# Patient Record
Sex: FEMALE | Race: WHITE | NOT HISPANIC OR LATINO | Employment: FULL TIME | URBAN - METROPOLITAN AREA
[De-identification: names, ages, dates, MRNs, and addresses within clinical notes are randomized per-mention and may not be internally consistent; named-entity substitution may affect disease eponyms.]

---

## 2021-05-13 ENCOUNTER — EDUCATION (OUTPATIENT)
Dept: GYNECOLOGIC ONCOLOGY | Facility: CLINIC | Age: 51
End: 2021-05-13

## 2021-05-13 ENCOUNTER — OFFICE VISIT (OUTPATIENT)
Dept: GYNECOLOGIC ONCOLOGY | Facility: CLINIC | Age: 51
End: 2021-05-13

## 2021-05-13 VITALS
OXYGEN SATURATION: 97 % | DIASTOLIC BLOOD PRESSURE: 80 MMHG | HEIGHT: 61 IN | BODY MASS INDEX: 55.32 KG/M2 | RESPIRATION RATE: 18 BRPM | WEIGHT: 293 LBS | TEMPERATURE: 97.5 F | HEART RATE: 82 BPM | SYSTOLIC BLOOD PRESSURE: 131 MMHG

## 2021-05-13 DIAGNOSIS — N85.00 ENDOMETRIAL HYPERPLASIA: Primary | ICD-10-CM

## 2021-05-13 PROCEDURE — 99204 OFFICE O/P NEW MOD 45 MIN: CPT | Performed by: OBSTETRICS & GYNECOLOGY

## 2021-05-13 RX ORDER — OMEPRAZOLE 20 MG/1
20 CAPSULE, DELAYED RELEASE ORAL DAILY PRN
COMMUNITY

## 2021-05-13 RX ORDER — MEDROXYPROGESTERONE ACETATE 10 MG/1
10 TABLET ORAL DAILY
Qty: 30 TABLET | Refills: 1 | Status: SHIPPED | OUTPATIENT
Start: 2021-05-13 | End: 2021-07-16 | Stop reason: HOSPADM

## 2021-05-13 RX ORDER — ACETAMINOPHEN 325 MG/1
975 TABLET ORAL ONCE
Status: CANCELLED | OUTPATIENT
Start: 2021-05-13 | End: 2021-05-13

## 2021-05-13 RX ORDER — FEXOFENADINE HYDROCHLORIDE 60 MG/1
60 TABLET, FILM COATED ORAL DAILY
COMMUNITY

## 2021-05-13 RX ORDER — HEPARIN SODIUM 5000 [USP'U]/ML
5000 INJECTION, SOLUTION INTRAVENOUS; SUBCUTANEOUS ONCE
Status: CANCELLED | OUTPATIENT
Start: 2021-05-13 | End: 2021-05-13

## 2021-05-13 NOTE — PROGRESS NOTES
New Patient Office Visit      Patient Name: Julia Fowler  : 1970   MRN: [id number]     Referring Physician: Renetta Nelson*    Chief Complaint:     Chief Complaint   Patient presents with   • Appointment     Endometrial hyperplasia       History of Present Illness: Julia Fowler is a 50 y.o. female who is here today as a consultation for gynecologic oncology. The patient had post-menopausal bleeding since August last year and underwent hysteroscopy, D&C and polypectomy with Myosure.  Bleeding has improved 2 weeks after surgery but pathology returned as atypical papillary and mucinous metaplasia within a background of simple hyperplasia.  She overall feels well, bleeding has improved.  She denies any weight changes, fever or chills, abdominal distention, nausea or vomiting, or change in bowel habits.      Patient Active Problem List    Diagnosis Date Noted   • Endometrial hyperplasia 2021     Note Last Updated: 2021     Referred by Dr. Renetta Nelson    3/3/2021: Endometrial biopsy with atypical papillary and mucinous metaplasia within a background of simple hyperplasia  2021: Endometrial curettings with atypical complex hyperplasia         Past Medical History:   Past Medical History:   Diagnosis Date   • Asthma    • Depression      & May 26th-[facility] for mother       Past Surgical History:   Past Surgical History:   Procedure Laterality Date   • BREAST BIOPSY Right     Benign   • DILATION AND CURETTAGE, DIAGNOSTIC / THERAPEUTIC  2019    Cyst Removal   • ENDOSCOPY     • HYSTEROSCOPY  2021    with D & C   • TONSILLECTOMY         Family History:   Family History   Problem Relation Age of Onset   • Hodgkin's lymphoma Father    • Hypertension Father    • Lung cancer Father    • Heart attack Father    • Breast cancer Mother    • Thyroid disease Mother    • Migraines Sister    • Other Sister         encephalopathy   • Migraines Daughter    • Migraines  Sister    • Ovarian cancer Neg Hx    • Uterine cancer Neg Hx    • Colon cancer Neg Hx    • Melanoma Neg Hx    • Prostate cancer Neg Hx        Social History:   Social History     Socioeconomic History   • Marital status:      Spouse name: Not on file   • Number of children: Not on file   • Years of education: Not on file   • Highest education level: Not on file   Tobacco Use   • Smoking status: Never Smoker   • Smokeless tobacco: Never Used   Vaping Use   • Vaping Use: Never used   Substance and Sexual Activity   • Alcohol use: Never   • Drug use: Never   • Sexual activity: Yes       Past OB/GYN History:   OB History   No obstetric history on file.   Patient is a .  She has had 2 prior vaginal deliveries.  She used oral contraceptive pills as hormone replacement therapy around menopause.  Menarche was at 11.  Menopause was at 49.  She had history of one abnormal Pap smear several years ago, that was normal on repeat.     Health Maintenance:   Mammogram: Date: 2020 results: Negative  Colonoscopy: Date:  with EGD results: negative    Medications:     Current Outpatient Medications:   •  fexofenadine (ALLEGRA) 60 MG tablet, Take 60 mg by mouth Daily., Disp: , Rfl:   •  omeprazole (priLOSEC) 20 MG capsule, Take 20 mg by mouth Daily., Disp: , Rfl:   •  medroxyPROGESTERone (PROVERA) 10 MG tablet, Take 1 tablet by mouth Daily., Disp: 30 tablet, Rfl: 1    Allergies:   Allergies   Allergen Reactions   • Reglan [Metoclopramide] Mental Status Change   • Sudafed [Pseudoephedrine] Other (See Comments)     Heavily Sleep       Review of Systems:   Review of Systems   Constitutional: Positive for chills and fatigue. Negative for appetite change, fever and unexpected weight change.   HENT: Positive for sinus pressure. Negative for congestion, hearing loss and sore throat.    Eyes: Negative for redness and visual disturbance.   Respiratory: Positive for wheezing. Negative for cough and shortness of breath.   "  Cardiovascular: Negative for chest pain, palpitations and leg swelling.   Gastrointestinal: Negative for abdominal distention, abdominal pain, blood in stool, constipation, diarrhea, nausea and vomiting.        Acid reflux   Endocrine: Negative for cold intolerance and heat intolerance.   Genitourinary: Positive for enuresis. Negative for difficulty urinating, dyspareunia, dysuria, frequency, genital sores, hematuria, pelvic pain, urgency, vaginal bleeding, vaginal discharge and vaginal pain.        Leaking urine   Musculoskeletal: Negative for arthralgias, back pain, gait problem and joint swelling.   Skin: Negative for rash and wound.   Allergic/Immunologic: Negative for food allergies and immunocompromised state.   Neurological: Negative for dizziness, seizures, syncope, weakness, light-headedness, numbness and headaches.   Hematological: Negative for adenopathy. Does not bruise/bleed easily.        Anemia   Psychiatric/Behavioral: Positive for dysphoric mood. Negative for confusion and sleep disturbance. The patient is not nervous/anxious.         Objective     Physical Exam:  Vital Signs:   Vitals:    05/13/21 1049   BP: 131/80  Comment: Left Wrist   Pulse: 82   Resp: 18   Temp: 97.5 °F (36.4 °C)   TempSrc: Infrared   SpO2: 97%  Comment: RA   Weight: 133 kg (293 lb)   Height: 154.9 cm (61\")   PainSc: 0-No pain     BMI: Body mass index is 55.36 kg/m².   ECOG score: 0         PHQ-2 Depression Screening  Little interest or pleasure in doing things? 0   Feeling down, depressed, or hopeless? 0   PHQ-2 Total Score 0     Physical Exam  Vitals and nursing note reviewed. Exam conducted with a chaperone present.   Constitutional:       General: She is not in acute distress.     Appearance: Normal appearance. She is well-developed. She is obese. She is not diaphoretic.   HENT:      Head: Normocephalic and atraumatic.      Right Ear: External ear normal.      Left Ear: External ear normal.      Nose: Nose normal.   Eyes: "      General: No scleral icterus.        Right eye: No discharge.         Left eye: No discharge.      Conjunctiva/sclera: Conjunctivae normal.   Neck:      Thyroid: No thyromegaly.   Cardiovascular:      Rate and Rhythm: Normal rate and regular rhythm.      Heart sounds: No murmur heard.     Pulmonary:      Effort: Pulmonary effort is normal. No respiratory distress.      Breath sounds: Normal breath sounds. No wheezing.   Abdominal:      General: Bowel sounds are normal. There is no distension.      Palpations: Abdomen is soft. There is no mass.      Tenderness: There is no abdominal tenderness. There is no guarding.      Hernia: No hernia is present.   Genitourinary:     Comments: External genitalia normal. Vagina without discharge. Cervix normal. Uterus normal and mobile. No adnexal masses. Rectovaginal exam deferred.  Musculoskeletal:         General: No swelling, tenderness, deformity or signs of injury.      Cervical back: Normal range of motion and neck supple.      Right lower leg: No edema.      Left lower leg: No edema.   Lymphadenopathy:      Cervical: No cervical adenopathy.   Skin:     General: Skin is warm and dry.      Findings: No erythema, lesion or rash.   Neurological:      General: No focal deficit present.      Mental Status: She is alert and oriented to person, place, and time. Mental status is at baseline.   Psychiatric:         Behavior: Behavior normal.         Thought Content: Thought content normal.       Pathology  3/3/2021: Endometrial biopsy with atypical papillary and mucinous metaplasia within a background of simple hyperplasia  4/8/2021: Endometrial curettings with atypical complex hyperplasia    Assessment / Plan    Julia Fowler is a 50 y.o. year old female recently diagnosed with complex atypical hyperplasia of the endometrium. I have counseled her that the risk of concomitant endometrial cancer exceeds 40% and that surgery is the cornerstone of management. I have recommended  that the patient undergo a robotic assisted-total laparoscopic hysterectomy with bilateral salpingo-oophorectomy, with sentinel lymph node dissection. Given the increased risk of concurrent cancer, I recommended lymph node assessment via the sentinel node approach +/- pelvic/para-aortic lymph node dissection pending operative findings.  We discussed the importance of awaiting final pathology and also discussed the nature of endometrial cancer. She is aware that the majority of endometrial cancer cases are found in the early stages; however, if more advanced disease is encountered, she may require chemotherapy, radiation or a combination of the two. She was also informed of the risks of the procedure including bleeding, infection, wound breakdown, blood clots, injury to surrounding organs requiring additional intervention, possible need for another procedure, and the need for a laparotomy if the surgery cannot be performed safely via the minimally invasive approach. We also discussed the anticipated hospital stay and recovery time. After all questions were answered, she did sign the informed consent and will be scheduled for surgery in the near future.      Patient states she is unable to do the surgery until July due to work.  We discussed we like to do the surgery soon as possible.  In the meantime, will start on Provera. We did discuss the risk of progression of disease with delay in surgery. Patient verbalizes understanding. I strongly encouraged patient to keep surgery as scheduled in July if possible. She was also instructed to call the office if she develops worsening vaginal bleeding or discharge.    Pain assessment was performed today as a part of patient’s care.  For patients with pain related to surgery, gynecologic malignancy or cancer treatment, the plan is as noted in the assessment/plan.  For patients with pain not related to these issues, they are to seek any further needed care from a more  appropriate provider, such as PCP.      Jennifer Rodriguez MD  Gynecologic Oncology Resident    Patient was seen and examined with Dr. Rodriguez,  resident, who performed portions of the examination and documentation for this patient's care under my direct supervision.  I agree with the above documentation and plan.    Diagnoses and all orders for this visit:    1. Endometrial hyperplasia (Primary)  -     CBC & Differential; Future  -     Comprehensive Metabolic Panel; Future  -     XR Chest 2 View; Future  -     Case Request; Standing  -     ECG 12 Lead; Future  -     Type & Screen; Future  -     Pregnancy, Urine - Urine, Clean Catch; Future  -     Case Request    Other orders  -     Verify NPO Status; Future  -     Obtain Informed Consent; Future  -     Provide Instructions to Patient on NPO Status; Future  -     Chlorhexidine Skin Prep; Future  -     Instruct Patient on Coughing, Deep Breathing & Incentive Spirometry; Future  -     Verify NPO Status; Standing  -     Obtain Informed Consent If Not Already Obtained; Standing  -     Notify Physician - Standard; Standing  -     Initiate Observation Status; Standing  -     ceFAZolin (ANCEF) 3 g in sodium chloride 0.9 % 100 mL IVPB  -     heparin (porcine) 5000 UNIT/ML injection 5,000 Units  -     acetaminophen (TYLENOL) tablet 975 mg  -     medroxyPROGESTERone (PROVERA) 10 MG tablet; Take 1 tablet by mouth Daily.  Dispense: 30 tablet; Refill: 1    MDM  Number of Diagnoses or Management Options  Endometrial hyperplasia: new, needed workup     Amount and/or Complexity of Data Reviewed  Clinical lab tests: (UPT, CBC, CMP, type and screen and COVID ordered. Pathology from March and April 2021 reviewed.)  Tests in the radiology section of CPT®: ordered (CXR)  Tests in the medicine section of CPT®: ordered (EKG)  Discussion of test results with the performing providers: no  Decide to obtain previous medical records or to obtain history from someone other than the patient:  no  Obtain history from someone other than the patient: no  Review and summarize past medical records: yes (Records from Dr. Nelson)  Discuss the patient with other providers: no  Independent visualization of images, tracings, or specimens: no    Risk of Complications, Morbidity, and/or Mortality  Presenting problems: moderate  Diagnostic procedures: moderate  Management options: moderate           Follow up: Surgery    AMARA Joyce MD  Gynecologic Oncology     Please note that portions of this note may have been completed with a voice recognition program.

## 2021-05-13 NOTE — PATIENT INSTRUCTIONS
Laparoscopic Surgery Instructions            Julia Fowler  [id number]  1970      SURGEON:  Valeria Joyce MD    Surgery Coordinator: Chelsea   If you have any questions before or after surgery please call.  [phone]       Appointment      1. You have pre-admission testing (PAT) appointment on 07/14/2021  at 1:30 PM You will need to be at hospital registration 10 minutes before that time. The registration department is located in the long hallway between the [facility] and [address] [place] [address]. This is on the first floor of the [facility] hospital you can enter through the [address] [place] [address].      2.  Your surgery has been scheduled on 07/16/2021.  You will need to be at the [address] [place] [address] second floor surgery registration on that day at 05:30 AM.    The Day(s) Before Surgery     ·  Nothing by mouth after midnight on 07/13/2021.    · Plan to have someone take you home from the hospital.    · Do not use any products that contain nicotine or tobacco, such as cigarettes and e-cigarettes. These can delay healing after surgery. If you need help quitting, ask your health care provider.    ·  Do not take vitamins or aspirin one week before surgery ( if applicable).  On the morning of your surgery, you may take you prescription medications with a sip of water, unless told otherwise by your provider.  Bring them with you to the hospital (Diabetic patient should bring insulin if instructed by the managing physician). If you are taking a blood thinner ( Eliquis, Coumadin, Xarelto, Lovenox, Asprin, Heparin, etc.) please have the provider that manages this instruct you on when to stop taking prior to surgery.     · If you are feeling sick, have a fever or cough and have seen your PCP let our office know 48 hours prior to surgery. It may be subject to rescheduling.            What can I expect after the procedure?    A normal length of stay for laparoscopic procedures can be same day or up to 23 hours.     After the procedure, it  is common to have:  · Pain.  · Soreness and numbness in your incision areas.  · Vaginal bleeding and discharge up to 6 weeks after surgery.  · Constipation.  · Temporary change in bladder function.  · Feelings of sadness or other emotions.  · Small amounts of clear drainage from incisions  · If you are discharged with an abdominal binder, this is to be used as needed for incisional comfort. You may choose to discontinue use at any time.      Follow these instructions at home:  Medicines    · Please take any medications that have been prescribed after your surgery, you may take over the counter Tylenol and Ibuprofen, unless told otherwise by your provider.   · Please take your stool softener as prescribed. If you do not have a bowel movement within 24 hours following surgery try a laxative (milk of magnesia) or you may take Tiffanie-Lax.    Activity    · Return to your normal activities as told by your health care provider.   · You may be told to take short walks every day and go farther each time.  · Do not lift anything that is heavier than 20 lbs.      General instructions    · Do not put anything in your vagina for 6 weeks after your surgery or as told by your health care provider. This includes tampons and douches.  · Do not have sex until your health care provider says you can.  · Do not take baths, swim, or use a hot tub until your health care provider approves.  · Drink enough fluid to keep your urine clear or pale yellow.  · Do not drive for 24 hours if you were given a sedative.  · Do not drive while taking Narcotic Pain medication ( oxycodone, hydrocodone, etc.).   · Keep all follow-up visits as told by your health care provider. This is important. You will have a post op appointment typically 3 weeks after surgery.  · Make sure you are urinating on a scheduled basis, for example every 2 hours. This will help retrain your bladder after surgery and prevent urinary tract infections.     Contact a health care  provider if:    · Your pain medicine is not helping.  · You have a fever over 101.0  · You have redness, swelling, or pain at your incision site.  · You continue to have difficulty urinating.  · You have not had a bowel movement 2-3 days after surgery, experience nausea and vomiting, are unable to pass gas.   · Large volumes of drainage or blood from incisions, requiring multiple guaze changes.     Get help right away if:    · You have severe abdominal or back pain that is not controlled with medication.  · You have heavy bleeding from your vagina that saturates a maxi pad within 1-2 hours. Note- vaginal bleeding and spotting is normal up to 6 weeks from a hysterectomy, Heavy Bleeding is not.     If you experience a medical emergency call 911 or have someone drive you to your nearest emergency department.

## 2021-05-13 NOTE — PATIENT INSTRUCTIONS
Total Laparoscopic Hysterectomy, Care After  This sheet gives you information about how to care for yourself after your procedure. Your health care provider may also give you more specific instructions. If you have problems or questions, contact your health care provider.  What can I expect after the procedure?  After the procedure, it is common to have:  · Pain and bruising around your incisions.  · A sore throat, if a breathing tube was used during surgery.  · Tiredness (fatigue).  · Poor appetite.  · Less interest in sex.  If your ovaries were also removed, it is also common to have symptoms of menopause, such as hot flashes, night sweats, and lack of sleep (insomnia).  Follow these instructions at home:  Medicines  · Take over-the-counter and prescription medicines only as told by your health care provider.  · Ask your health care provider if the medicine prescribed to you:  ? Requires you to avoid driving or using machinery.  ? Can cause constipation. You may need to take these actions to prevent or treat constipation:  § Drink enough fluid to keep your urine pale yellow.  § Take over-the-counter or prescription medicines.  § Eat foods that are high in fiber, such as beans, whole grains, and fresh fruits and vegetables.  § Limit foods that are high in fat and processed sugars, such as fried or sweet foods.  Incision care    · Follow instructions from your health care provider about how to take care of your incisions. Make sure you:  ? Wash your hands with soap and water for at least 20 seconds before and after you change your bandage (dressing). If soap and water are not available, use hand .  ? Change your dressing as told by your health care provider.  ? Leave stitches (sutures), skin glue, or adhesive strips in place. These skin closures may need to stay in place for 2 weeks or longer. If adhesive strip edges start to loosen and curl up, you may trim the loose edges. Do not remove adhesive strips  completely unless your health care provider tells you to do that.  · Check your incision areas every day for signs of infection. Check for:  ? Redness, swelling, or pain.  ? Fluid or blood.  ? Warmth.  ? Pus or a bad smell.  Bathing    · Do not take baths, swim, or use a hot tub until your health care provider approves. You may only be allowed to take showers for 2-3 weeks.  · Keep your dressing dry until your health care provider says it can be removed.  Activity    · Try to have someone home with you for 1-2 weeks to help you with everyday chores.  · Rest as told by your health care provider.  · Avoid sitting for a long time without moving. Get up to take short walks every 1-2 hours. This is important to improve blood flow and breathing. Ask for help if you feel weak or unsteady.  · Return to your normal activities as told by your health care provider. Ask your health care provider what activities are safe for you.  · Do not lift anything that is heavier than 10 lb (4.5 kg), or the limit that you are told, for one month after surgery or until your health care provider says that it is safe.  · If you were given a sedative during the procedure, it can affect you for several hours. Do not drive or operate machinery until your health care provider says that it is safe.  Lifestyle  · Do not use any products that contain nicotine or tobacco, such as cigarettes, e-cigarettes, and chewing tobacco. These can delay healing. If you need help quitting, ask your health care provider.  · Do not drink alcohol until your health care provider approves.  General instructions  · Do not douche, use tampons, or have sex for at least 6 weeks, or as told by your health care provider.  · Monitor your temperature for as long as told by your health care provider.  · If you struggle with physical or emotional changes after your procedure, speak with your health care provider or a therapist.  · Keep all follow-up visits as told by your  health care provider. This is important.  Contact a health care provider if:  · You have any of these signs of infection:  ? Chills or a fever.  ? Redness, swelling, or pain around an incision.  ? Fluid or blood coming from an incision.  ? Warmth coming from an incision.  ? Pus or a bad smell coming from an incision.  · An incision breaks open.  · You feel dizzy or light-headed.  · You have pain or bleeding when you urinate, or you are unable to urinate.  · You have abnormal vaginal discharge.  · You have pain that does not get better with medicine.  Get help right away if:  · You have a fever and your symptoms suddenly get worse.  · You have severe abdominal pain.  · You have chest pain or shortness of breath.  · You faint.  · You have pain, swelling, or redness in your leg.  · You have heavy vaginal bleeding with blood clots.  Summary  · After the procedure, it is common to have pain and bruising around your incisions.  · Do not take baths, swim, or use a hot tub until your health care provider approves.  · Do not lift anything that is heavier than 10 lb (4.5 kg), or the limit that you are told, for one month after surgery or until your health care provider says that it is safe.  · Tell your health care provider if you have any signs or symptoms of infection after the procedure.  · Get help right away if you have severe pain in the abdomen, chest pain, shortness of breath, or heavy bleeding from your vagina.  This information is not intended to replace advice given to you by your health care provider. Make sure you discuss any questions you have with your health care provider.  Document Revised: 10/12/2020 Document Reviewed: 10/12/2020  Elsevier Patient Education © 2021 [employer]

## 2021-05-13 NOTE — PROGRESS NOTES
New Patient Office Visit      Patient Name: Julia Fowler  : 1970   MRN: [id number]     Referring Physician: Renetta Nelson*    Chief Complaint:  No chief complaint on file.      History of Present Illness: Julia Fowler is a 50 y.o. female who is here today to establish care with Oncology. ***    Problem   Endometrial Hyperplasia    Referred by Dr. Renetta Nelson    3/3/2021: Endometrial biopsy with atypical papillary and mucinous metaplasia within a background of simple hyperplasia  2021: Endometrial curettings with atypical complex hyperplasia       Past Medical History: No past medical history on file.    Past Surgical History: No past surgical history on file.    Family History: No family history on file.    Social History:      Past OB/GYN History:   OB History   No obstetric history on file.        Health Maintenance:   Mammogram: Date: *** Results: ***  Colonoscopy: Date: *** Results: ***     Medications:   No current outpatient medications on file.    Allergies:   Not on File    Review of Systems:   Review of Systems     Objective     Physical Exam:  Vital Signs: There were no vitals filed for this visit.  BMI: There is no height or weight on file to calculate BMI.   ECOG PS: ***              PHQ-2 Depression Screening  Little interest or pleasure in doing things?     Feeling down, depressed, or hopeless?     PHQ-2 Total Score       Physical Exam    Assessment / Plan    Julia Fowler is a 50 y.o. year old female recently diagnosed with complex atypical hyperplasia of the endometrium. I have counseled her that the risk of concomitant endometrial cancer exceeds 40% and that surgery is the cornerstone of management. I have recommended that the patient undergo a ***. Given the increased risk of concurrent cancer, I recommended lymph node assessment*** via the sentinel node approach +/- pelvic/para-aortic lymph node dissection pending operative findings.  We discussed the  importance of awaiting final pathology and also discussed the nature of endometrial cancer. She is aware that the majority of endometrial cancer cases are found in the early stages; however, if more advanced disease is encountered, she may require chemotherapy, radiation or a combination of the two. She was also informed of the risks of the procedure including bleeding, infection, wound breakdown, blood clots, injury to surrounding organs requiring additional intervention, possible need for another procedure, and the need for a laparotomy if the surgery cannot be performed safely via the minimally invasive approach. We also discussed the anticipated hospital stay and recovery time. After all questions were answered, she did sign the informed consent and will be scheduled for surgery in the near future.      Pain assessment was performed today as a part of patient’s care.  For patients with pain related to surgery, gynecologic malignancy or cancer treatment, the plan is as noted in the assessment/plan.  For patients with pain not related to these issues, they are to seek any further needed care from a more appropriate provider, such as PCP.      Depression assessment was performed as indicated. For patients with prior diagnoses of anxiety/depression and currently on medications, they were encouraged to seek any further needed care from their PCP or mental health professional. For those patients without a prior diagnosis and concern for depression, the plan is as noted in the assessment/plan.     There are no diagnoses linked to this encounter.     Follow Up: ***    AMARA Joyce MD  Gynecologic Oncology     Please note that portions of this note may have been completed with a voice recognition program.

## 2021-05-26 ENCOUNTER — APPOINTMENT (OUTPATIENT)
Dept: PREADMISSION TESTING | Facility: HOSPITAL | Age: 51
End: 2021-05-26

## 2021-07-14 ENCOUNTER — PRE-ADMISSION TESTING (OUTPATIENT)
Dept: PREADMISSION TESTING | Facility: HOSPITAL | Age: 51
End: 2021-07-14

## 2021-07-14 ENCOUNTER — HOSPITAL ENCOUNTER (OUTPATIENT)
Dept: GENERAL RADIOLOGY | Facility: HOSPITAL | Age: 51
Discharge: HOME | End: 2021-07-14

## 2021-07-14 VITALS — WEIGHT: 293 LBS | BODY MASS INDEX: 50.02 KG/M2 | HEIGHT: 64 IN

## 2021-07-14 DIAGNOSIS — N85.00 ENDOMETRIAL HYPERPLASIA: ICD-10-CM

## 2021-07-14 LAB
ABO GROUP BLD: NORMAL
ALBUMIN SERPL-MCNC: 4.2 G/DL (ref 3.5–5.2)
ALBUMIN/GLOB SERPL: 1.4 G/DL
ALP SERPL-CCNC: 128 U/L (ref 39–117)
ALT SERPL W P-5'-P-CCNC: 24 U/L (ref 1–33)
ANION GAP SERPL CALCULATED.3IONS-SCNC: 11 MMOL/L (ref 5–15)
AST SERPL-CCNC: 14 U/L (ref 1–32)
B-HCG UR QL: NEGATIVE
BASOPHILS # BLD AUTO: 0.04 10*3/MM3 (ref 0–0.2)
BASOPHILS NFR BLD AUTO: 0.6 % (ref 0–1.5)
BILIRUB SERPL-MCNC: 0.4 MG/DL (ref 0–1.2)
BLD GP AB SCN SERPL QL: NEGATIVE
BUN SERPL-MCNC: 12 MG/DL (ref 6–20)
BUN/CREAT SERPL: 16.4 (ref 7–25)
CALCIUM SPEC-SCNC: 9.2 MG/DL (ref 8.6–10.5)
CHLORIDE SERPL-SCNC: 104 MMOL/L (ref 98–107)
CO2 SERPL-SCNC: 28 MMOL/L (ref 22–29)
CREAT SERPL-MCNC: 0.73 MG/DL (ref 0.57–1)
DEPRECATED RDW RBC AUTO: 47 FL (ref 37–54)
EOSINOPHIL # BLD AUTO: 0.4 10*3/MM3 (ref 0–0.4)
EOSINOPHIL NFR BLD AUTO: 6.3 % (ref 0.3–6.2)
ERYTHROCYTE [DISTWIDTH] IN BLOOD BY AUTOMATED COUNT: 13.2 % (ref 12.3–15.4)
GFR SERPL CREATININE-BSD FRML MDRD: 84 ML/MIN/1.73
GLOBULIN UR ELPH-MCNC: 2.9 GM/DL
GLUCOSE SERPL-MCNC: 116 MG/DL (ref 65–99)
HCT VFR BLD AUTO: 42.3 % (ref 34–46.6)
HGB BLD-MCNC: 13.3 G/DL (ref 12–15.9)
IMM GRANULOCYTES # BLD AUTO: 0.02 10*3/MM3 (ref 0–0.05)
IMM GRANULOCYTES NFR BLD AUTO: 0.3 % (ref 0–0.5)
LYMPHOCYTES # BLD AUTO: 2.77 10*3/MM3 (ref 0.7–3.1)
LYMPHOCYTES NFR BLD AUTO: 44 % (ref 19.6–45.3)
MCH RBC QN AUTO: 30.6 PG (ref 26.6–33)
MCHC RBC AUTO-ENTMCNC: 31.4 G/DL (ref 31.5–35.7)
MCV RBC AUTO: 97.2 FL (ref 79–97)
MONOCYTES # BLD AUTO: 0.43 10*3/MM3 (ref 0.1–0.9)
MONOCYTES NFR BLD AUTO: 6.8 % (ref 5–12)
NEUTROPHILS NFR BLD AUTO: 2.64 10*3/MM3 (ref 1.7–7)
NEUTROPHILS NFR BLD AUTO: 42 % (ref 42.7–76)
NRBC BLD AUTO-RTO: 0 /100 WBC (ref 0–0.2)
PLATELET # BLD AUTO: 331 10*3/MM3 (ref 140–450)
PMV BLD AUTO: 10 FL (ref 6–12)
POTASSIUM SERPL-SCNC: 5.1 MMOL/L (ref 3.5–5.2)
PROT SERPL-MCNC: 7.1 G/DL (ref 6–8.5)
QT INTERVAL: 410 MS
QTC INTERVAL: 476 MS
RBC # BLD AUTO: 4.35 10*6/MM3 (ref 3.77–5.28)
RH BLD: NEGATIVE
SARS-COV-2 RNA PNL SPEC NAA+PROBE: NOT DETECTED
SODIUM SERPL-SCNC: 143 MMOL/L (ref 136–145)
T&S EXPIRATION DATE: NORMAL
WBC # BLD AUTO: 6.3 10*3/MM3 (ref 3.4–10.8)

## 2021-07-14 PROCEDURE — 93010 ELECTROCARDIOGRAM REPORT: CPT | Performed by: INTERNAL MEDICINE

## 2021-07-14 PROCEDURE — C9803 HOPD COVID-19 SPEC COLLECT: HCPCS

## 2021-07-14 PROCEDURE — 81025 URINE PREGNANCY TEST: CPT

## 2021-07-14 PROCEDURE — U0004 COV-19 TEST NON-CDC HGH THRU: HCPCS

## 2021-07-14 PROCEDURE — 80053 COMPREHEN METABOLIC PANEL: CPT

## 2021-07-14 PROCEDURE — 36415 COLL VENOUS BLD VENIPUNCTURE: CPT

## 2021-07-14 PROCEDURE — 86900 BLOOD TYPING SEROLOGIC ABO: CPT

## 2021-07-14 PROCEDURE — 71046 X-RAY EXAM CHEST 2 VIEWS: CPT

## 2021-07-14 PROCEDURE — 85025 COMPLETE CBC W/AUTO DIFF WBC: CPT

## 2021-07-14 PROCEDURE — 86901 BLOOD TYPING SEROLOGIC RH(D): CPT

## 2021-07-14 PROCEDURE — 93005 ELECTROCARDIOGRAM TRACING: CPT

## 2021-07-14 PROCEDURE — 86850 RBC ANTIBODY SCREEN: CPT

## 2021-07-14 NOTE — PAT
Patient to apply Chlorhexadine wipes  to surgical area (as instructed) the night before procedure and the AM of procedure. Wipes provided.    Patient directed to Radiology Department for CXR after Pre Admission Testing Appointment.     Blood bank bracelet applied to patient during Pre Admission Testing visit.  Patient instructed not to remove from arm until after procedure and they are discharged from the hospital.  Explained to patient that they may shower and get the bracelet wet, but not to immerse under water for longer periods (bathing, swimming, hand dishwashing, etc).  Patient verbalized understanding.    Colon screening information booklet given to patient during PAT visit    COVID TEST PERFORMED IN [facility] TODAY

## 2021-07-15 ENCOUNTER — ANESTHESIA EVENT (OUTPATIENT)
Dept: PERIOP | Facility: HOSPITAL | Age: 51
End: 2021-07-15

## 2021-07-15 RX ORDER — FAMOTIDINE 10 MG/ML
20 INJECTION, SOLUTION INTRAVENOUS ONCE
Status: CANCELLED | OUTPATIENT
Start: 2021-07-15 | End: 2021-07-15

## 2021-07-16 ENCOUNTER — HOSPITAL ENCOUNTER (OUTPATIENT)
Facility: HOSPITAL | Age: 51
Discharge: HOME | End: 2021-07-16
Attending: OBSTETRICS & GYNECOLOGY | Admitting: OBSTETRICS & GYNECOLOGY

## 2021-07-16 ENCOUNTER — ANESTHESIA (OUTPATIENT)
Dept: PERIOP | Facility: HOSPITAL | Age: 51
End: 2021-07-16

## 2021-07-16 VITALS
OXYGEN SATURATION: 99 % | DIASTOLIC BLOOD PRESSURE: 89 MMHG | HEART RATE: 74 BPM | SYSTOLIC BLOOD PRESSURE: 156 MMHG | TEMPERATURE: 98.6 F | RESPIRATION RATE: 20 BRPM

## 2021-07-16 DIAGNOSIS — N85.00 ENDOMETRIAL HYPERPLASIA: ICD-10-CM

## 2021-07-16 LAB
ABO GROUP BLD: NORMAL
B-HCG UR QL: NEGATIVE
INTERNAL NEGATIVE CONTROL: NEGATIVE
INTERNAL POSITIVE CONTROL: POSITIVE
Lab: NORMAL
RH BLD: NEGATIVE

## 2021-07-16 PROCEDURE — G0378 HOSPITAL OBSERVATION PER HR: HCPCS

## 2021-07-16 PROCEDURE — 88341 IMHCHEM/IMCYTCHM EA ADD ANTB: CPT | Performed by: OBSTETRICS & GYNECOLOGY

## 2021-07-16 PROCEDURE — 63710000001 PROMETHAZINE PER 25 MG: Performed by: OBSTETRICS & GYNECOLOGY

## 2021-07-16 PROCEDURE — 38571 LAPAROSCOPY LYMPHADENECTOMY: CPT | Performed by: PHYSICIAN ASSISTANT

## 2021-07-16 PROCEDURE — 86901 BLOOD TYPING SEROLOGIC RH(D): CPT

## 2021-07-16 PROCEDURE — 88321 CONSLTJ&REPRT SLD PREP ELSWR: CPT

## 2021-07-16 PROCEDURE — 58571 TLH W/T/O 250 G OR LESS: CPT | Performed by: PHYSICIAN ASSISTANT

## 2021-07-16 PROCEDURE — 25010000002 ONDANSETRON PER 1 MG: Performed by: NURSE ANESTHETIST, CERTIFIED REGISTERED

## 2021-07-16 PROCEDURE — 25010000002 HYDROMORPHONE PER 4 MG: Performed by: NURSE ANESTHETIST, CERTIFIED REGISTERED

## 2021-07-16 PROCEDURE — 25010000002 DEXAMETHASONE PER 1 MG: Performed by: NURSE ANESTHETIST, CERTIFIED REGISTERED

## 2021-07-16 PROCEDURE — 25010000002 HEPARIN (PORCINE) PER 1000 UNITS: Performed by: OBSTETRICS & GYNECOLOGY

## 2021-07-16 PROCEDURE — 38900 IO MAP OF SENT LYMPH NODE: CPT | Performed by: OBSTETRICS & GYNECOLOGY

## 2021-07-16 PROCEDURE — 88360 TUMOR IMMUNOHISTOCHEM/MANUAL: CPT | Performed by: OBSTETRICS & GYNECOLOGY

## 2021-07-16 PROCEDURE — 38571 LAPAROSCOPY LYMPHADENECTOMY: CPT | Performed by: OBSTETRICS & GYNECOLOGY

## 2021-07-16 PROCEDURE — 25010000002 FENTANYL CITRATE (PF) 50 MCG/ML SOLUTION: Performed by: NURSE ANESTHETIST, CERTIFIED REGISTERED

## 2021-07-16 PROCEDURE — 88307 TISSUE EXAM BY PATHOLOGIST: CPT | Performed by: OBSTETRICS & GYNECOLOGY

## 2021-07-16 PROCEDURE — S0260 H&P FOR SURGERY: HCPCS | Performed by: OBSTETRICS & GYNECOLOGY

## 2021-07-16 PROCEDURE — 38900 IO MAP OF SENT LYMPH NODE: CPT | Performed by: PHYSICIAN ASSISTANT

## 2021-07-16 PROCEDURE — 88342 IMHCHEM/IMCYTCHM 1ST ANTB: CPT | Performed by: OBSTETRICS & GYNECOLOGY

## 2021-07-16 PROCEDURE — 58571 TLH W/T/O 250 G OR LESS: CPT | Performed by: OBSTETRICS & GYNECOLOGY

## 2021-07-16 PROCEDURE — 25010000003 MEPERIDINE PER 100 MG: Performed by: NURSE ANESTHETIST, CERTIFIED REGISTERED

## 2021-07-16 PROCEDURE — 88309 TISSUE EXAM BY PATHOLOGIST: CPT | Performed by: OBSTETRICS & GYNECOLOGY

## 2021-07-16 PROCEDURE — 25010000002 NEOSTIGMINE 10 MG/10ML SOLUTION: Performed by: NURSE ANESTHETIST, CERTIFIED REGISTERED

## 2021-07-16 PROCEDURE — 81025 URINE PREGNANCY TEST: CPT | Performed by: ANESTHESIOLOGY

## 2021-07-16 PROCEDURE — 25010000003 LIDOCAINE 1 % SOLUTION: Performed by: NURSE ANESTHETIST, CERTIFIED REGISTERED

## 2021-07-16 PROCEDURE — 25010000002 MIDAZOLAM PER 1 MG: Performed by: NURSE ANESTHETIST, CERTIFIED REGISTERED

## 2021-07-16 PROCEDURE — 25010000002 PROPOFOL 10 MG/ML EMULSION: Performed by: NURSE ANESTHETIST, CERTIFIED REGISTERED

## 2021-07-16 PROCEDURE — 86900 BLOOD TYPING SEROLOGIC ABO: CPT

## 2021-07-16 RX ORDER — FENTANYL CITRATE 50 UG/ML
50 INJECTION, SOLUTION INTRAMUSCULAR; INTRAVENOUS
Status: DISCONTINUED | OUTPATIENT
Start: 2021-07-16 | End: 2021-07-16 | Stop reason: HOSPADM

## 2021-07-16 RX ORDER — DOCUSATE SODIUM 250 MG
250 CAPSULE ORAL 2 TIMES DAILY
Qty: 60 CAPSULE | Refills: 0 | Status: SHIPPED | OUTPATIENT
Start: 2021-07-16 | End: 2021-07-16 | Stop reason: HOSPADM

## 2021-07-16 RX ORDER — MIDAZOLAM HYDROCHLORIDE 1 MG/ML
1 INJECTION INTRAMUSCULAR; INTRAVENOUS
Status: DISCONTINUED | OUTPATIENT
Start: 2021-07-16 | End: 2021-07-16 | Stop reason: HOSPADM

## 2021-07-16 RX ORDER — ACETAMINOPHEN 325 MG/1
650 TABLET ORAL EVERY 4 HOURS PRN
Qty: 60 TABLET | Refills: 0 | Status: SHIPPED | OUTPATIENT
Start: 2021-07-16 | End: 2021-07-16 | Stop reason: HOSPADM

## 2021-07-16 RX ORDER — FAMOTIDINE 20 MG/1
20 TABLET, FILM COATED ORAL ONCE
Status: COMPLETED | OUTPATIENT
Start: 2021-07-16 | End: 2021-07-16

## 2021-07-16 RX ORDER — SODIUM CHLORIDE 9 MG/ML
INJECTION, SOLUTION INTRAVENOUS AS NEEDED
Status: DISCONTINUED | OUTPATIENT
Start: 2021-07-16 | End: 2021-07-16 | Stop reason: HOSPADM

## 2021-07-16 RX ORDER — IBUPROFEN 800 MG/1
800 TABLET ORAL EVERY 8 HOURS PRN
Qty: 30 TABLET | Refills: 0 | Status: SHIPPED | OUTPATIENT
Start: 2021-07-16 | End: 2021-08-05

## 2021-07-16 RX ORDER — ROCURONIUM BROMIDE 10 MG/ML
INJECTION, SOLUTION INTRAVENOUS AS NEEDED
Status: DISCONTINUED | OUTPATIENT
Start: 2021-07-16 | End: 2021-07-16 | Stop reason: SURG

## 2021-07-16 RX ORDER — HEPARIN SODIUM 5000 [USP'U]/ML
5000 INJECTION, SOLUTION INTRAVENOUS; SUBCUTANEOUS ONCE
Status: COMPLETED | OUTPATIENT
Start: 2021-07-16 | End: 2021-07-16

## 2021-07-16 RX ORDER — ONDANSETRON 4 MG/1
4 TABLET, FILM COATED ORAL ONCE AS NEEDED
Status: DISCONTINUED | OUTPATIENT
Start: 2021-07-16 | End: 2021-07-16 | Stop reason: HOSPADM

## 2021-07-16 RX ORDER — IPRATROPIUM BROMIDE AND ALBUTEROL SULFATE 2.5; .5 MG/3ML; MG/3ML
3 SOLUTION RESPIRATORY (INHALATION) ONCE AS NEEDED
Status: DISCONTINUED | OUTPATIENT
Start: 2021-07-16 | End: 2021-07-16 | Stop reason: HOSPADM

## 2021-07-16 RX ORDER — DEXAMETHASONE SODIUM PHOSPHATE 4 MG/ML
INJECTION, SOLUTION INTRA-ARTICULAR; INTRALESIONAL; INTRAMUSCULAR; INTRAVENOUS; SOFT TISSUE AS NEEDED
Status: DISCONTINUED | OUTPATIENT
Start: 2021-07-16 | End: 2021-07-16 | Stop reason: SURG

## 2021-07-16 RX ORDER — HYDROCODONE BITARTRATE AND ACETAMINOPHEN 5; 325 MG/1; MG/1
1 TABLET ORAL ONCE AS NEEDED
Status: COMPLETED | OUTPATIENT
Start: 2021-07-16 | End: 2021-07-16

## 2021-07-16 RX ORDER — ONDANSETRON 2 MG/ML
INJECTION INTRAMUSCULAR; INTRAVENOUS AS NEEDED
Status: DISCONTINUED | OUTPATIENT
Start: 2021-07-16 | End: 2021-07-16 | Stop reason: SURG

## 2021-07-16 RX ORDER — ONDANSETRON 2 MG/ML
4 INJECTION INTRAMUSCULAR; INTRAVENOUS ONCE AS NEEDED
Status: DISCONTINUED | OUTPATIENT
Start: 2021-07-16 | End: 2021-07-16 | Stop reason: HOSPADM

## 2021-07-16 RX ORDER — CEFAZOLIN SODIUM IN 0.9 % NACL 3 G/100 ML
3 INTRAVENOUS SOLUTION, PIGGYBACK (ML) INTRAVENOUS ONCE
Status: COMPLETED | OUTPATIENT
Start: 2021-07-16 | End: 2021-07-16

## 2021-07-16 RX ORDER — PROPOFOL 10 MG/ML
VIAL (ML) INTRAVENOUS AS NEEDED
Status: DISCONTINUED | OUTPATIENT
Start: 2021-07-16 | End: 2021-07-16 | Stop reason: SURG

## 2021-07-16 RX ORDER — OXYCODONE HYDROCHLORIDE AND ACETAMINOPHEN 5; 325 MG/1; MG/1
1-2 TABLET ORAL EVERY 4 HOURS PRN
Qty: 10 TABLET | Refills: 0 | Status: SHIPPED | OUTPATIENT
Start: 2021-07-16 | End: 2021-07-16 | Stop reason: HOSPADM

## 2021-07-16 RX ORDER — HYDROMORPHONE HYDROCHLORIDE 1 MG/ML
0.5 INJECTION, SOLUTION INTRAMUSCULAR; INTRAVENOUS; SUBCUTANEOUS
Status: DISCONTINUED | OUTPATIENT
Start: 2021-07-16 | End: 2021-07-16 | Stop reason: HOSPADM

## 2021-07-16 RX ORDER — MIDAZOLAM HYDROCHLORIDE 1 MG/ML
INJECTION INTRAMUSCULAR; INTRAVENOUS AS NEEDED
Status: DISCONTINUED | OUTPATIENT
Start: 2021-07-16 | End: 2021-07-16 | Stop reason: SURG

## 2021-07-16 RX ORDER — DEXAMETHASONE SODIUM PHOSPHATE 4 MG/ML
8 INJECTION, SOLUTION INTRA-ARTICULAR; INTRALESIONAL; INTRAMUSCULAR; INTRAVENOUS; SOFT TISSUE ONCE AS NEEDED
Status: DISCONTINUED | OUTPATIENT
Start: 2021-07-16 | End: 2021-07-16 | Stop reason: HOSPADM

## 2021-07-16 RX ORDER — DOCUSATE SODIUM 100 MG/1
100 CAPSULE, LIQUID FILLED ORAL 2 TIMES DAILY
Qty: 60 CAPSULE | Refills: 0 | Status: SHIPPED | OUTPATIENT
Start: 2021-07-16 | End: 2022-08-04

## 2021-07-16 RX ORDER — IBUPROFEN 600 MG/1
600 TABLET ORAL EVERY 6 HOURS PRN
Status: DISCONTINUED | OUTPATIENT
Start: 2021-07-16 | End: 2021-07-16 | Stop reason: HOSPADM

## 2021-07-16 RX ORDER — PROMETHAZINE HYDROCHLORIDE 12.5 MG/1
12.5 TABLET ORAL ONCE AS NEEDED
Status: COMPLETED | OUTPATIENT
Start: 2021-07-16 | End: 2021-07-16

## 2021-07-16 RX ORDER — LIDOCAINE HYDROCHLORIDE 10 MG/ML
INJECTION, SOLUTION INFILTRATION; PERINEURAL AS NEEDED
Status: DISCONTINUED | OUTPATIENT
Start: 2021-07-16 | End: 2021-07-16 | Stop reason: SURG

## 2021-07-16 RX ORDER — ONDANSETRON 2 MG/ML
4 INJECTION INTRAMUSCULAR; INTRAVENOUS ONCE AS NEEDED
Status: DISCONTINUED | OUTPATIENT
Start: 2021-07-16 | End: 2021-07-16 | Stop reason: SDUPTHER

## 2021-07-16 RX ORDER — LIDOCAINE HYDROCHLORIDE 10 MG/ML
0.5 INJECTION, SOLUTION EPIDURAL; INFILTRATION; INTRACAUDAL; PERINEURAL ONCE AS NEEDED
Status: COMPLETED | OUTPATIENT
Start: 2021-07-16 | End: 2021-07-16

## 2021-07-16 RX ORDER — GLYCOPYRROLATE 0.2 MG/ML
INJECTION INTRAMUSCULAR; INTRAVENOUS AS NEEDED
Status: DISCONTINUED | OUTPATIENT
Start: 2021-07-16 | End: 2021-07-16 | Stop reason: SURG

## 2021-07-16 RX ORDER — MAGNESIUM HYDROXIDE 1200 MG/15ML
LIQUID ORAL AS NEEDED
Status: DISCONTINUED | OUTPATIENT
Start: 2021-07-16 | End: 2021-07-16 | Stop reason: HOSPADM

## 2021-07-16 RX ORDER — BUPIVACAINE HYDROCHLORIDE AND EPINEPHRINE 5; 5 MG/ML; UG/ML
INJECTION, SOLUTION PERINEURAL AS NEEDED
Status: DISCONTINUED | OUTPATIENT
Start: 2021-07-16 | End: 2021-07-16 | Stop reason: HOSPADM

## 2021-07-16 RX ORDER — IBUPROFEN 600 MG/1
600 TABLET ORAL EVERY 6 HOURS PRN
Qty: 30 TABLET | Refills: 0 | Status: SHIPPED | OUTPATIENT
Start: 2021-07-16 | End: 2021-07-16 | Stop reason: HOSPADM

## 2021-07-16 RX ORDER — FENTANYL CITRATE 50 UG/ML
INJECTION, SOLUTION INTRAMUSCULAR; INTRAVENOUS AS NEEDED
Status: DISCONTINUED | OUTPATIENT
Start: 2021-07-16 | End: 2021-07-16 | Stop reason: SURG

## 2021-07-16 RX ORDER — SODIUM CHLORIDE 0.9 % (FLUSH) 0.9 %
10 SYRINGE (ML) INJECTION AS NEEDED
Status: DISCONTINUED | OUTPATIENT
Start: 2021-07-16 | End: 2021-07-16 | Stop reason: HOSPADM

## 2021-07-16 RX ORDER — OXYCODONE HYDROCHLORIDE 5 MG/1
5 TABLET ORAL EVERY 4 HOURS PRN
Qty: 10 TABLET | Refills: 0 | Status: SHIPPED | OUTPATIENT
Start: 2021-07-16 | End: 2021-08-05

## 2021-07-16 RX ORDER — ALBUTEROL SULFATE 90 UG/1
AEROSOL, METERED RESPIRATORY (INHALATION) AS NEEDED
Status: DISCONTINUED | OUTPATIENT
Start: 2021-07-16 | End: 2021-07-16 | Stop reason: SURG

## 2021-07-16 RX ORDER — PROPOFOL 10 MG/ML
VIAL (ML) INTRAVENOUS CONTINUOUS PRN
Status: DISCONTINUED | OUTPATIENT
Start: 2021-07-16 | End: 2021-07-16 | Stop reason: SURG

## 2021-07-16 RX ORDER — ACETAMINOPHEN 325 MG/1
650 TABLET ORAL EVERY 6 HOURS PRN
Qty: 60 TABLET | Refills: 0 | Status: SHIPPED | OUTPATIENT
Start: 2021-07-16 | End: 2021-08-05

## 2021-07-16 RX ORDER — MEPERIDINE HYDROCHLORIDE 25 MG/ML
12.5 INJECTION INTRAMUSCULAR; INTRAVENOUS; SUBCUTANEOUS
Status: DISCONTINUED | OUTPATIENT
Start: 2021-07-16 | End: 2021-07-16 | Stop reason: HOSPADM

## 2021-07-16 RX ORDER — PROMETHAZINE HYDROCHLORIDE 12.5 MG/1
12.5 SUPPOSITORY RECTAL ONCE AS NEEDED
Status: COMPLETED | OUTPATIENT
Start: 2021-07-16 | End: 2021-07-16

## 2021-07-16 RX ORDER — SODIUM CHLORIDE, SODIUM LACTATE, POTASSIUM CHLORIDE, CALCIUM CHLORIDE 600; 310; 30; 20 MG/100ML; MG/100ML; MG/100ML; MG/100ML
9 INJECTION, SOLUTION INTRAVENOUS CONTINUOUS
Status: DISCONTINUED | OUTPATIENT
Start: 2021-07-16 | End: 2021-07-16 | Stop reason: HOSPADM

## 2021-07-16 RX ORDER — NEOSTIGMINE METHYLSULFATE 1 MG/ML
INJECTION, SOLUTION INTRAVENOUS AS NEEDED
Status: DISCONTINUED | OUTPATIENT
Start: 2021-07-16 | End: 2021-07-16 | Stop reason: SURG

## 2021-07-16 RX ORDER — INDOCYANINE GREEN AND WATER 25 MG
KIT INJECTION AS NEEDED
Status: DISCONTINUED | OUTPATIENT
Start: 2021-07-16 | End: 2021-07-16 | Stop reason: HOSPADM

## 2021-07-16 RX ORDER — ACETAMINOPHEN 325 MG/1
975 TABLET ORAL ONCE
Status: COMPLETED | OUTPATIENT
Start: 2021-07-16 | End: 2021-07-16

## 2021-07-16 RX ORDER — LIDOCAINE 50 MG/G
OINTMENT TOPICAL AS NEEDED
Status: DISCONTINUED | OUTPATIENT
Start: 2021-07-16 | End: 2021-07-16 | Stop reason: SURG

## 2021-07-16 RX ORDER — SODIUM CHLORIDE 0.9 % (FLUSH) 0.9 %
10 SYRINGE (ML) INJECTION EVERY 12 HOURS SCHEDULED
Status: DISCONTINUED | OUTPATIENT
Start: 2021-07-16 | End: 2021-07-16 | Stop reason: HOSPADM

## 2021-07-16 RX ADMIN — DEXAMETHASONE SODIUM PHOSPHATE 8 MG: 4 INJECTION, SOLUTION INTRA-ARTICULAR; INTRALESIONAL; INTRAMUSCULAR; INTRAVENOUS; SOFT TISSUE at 09:24

## 2021-07-16 RX ADMIN — MEPERIDINE HYDROCHLORIDE 12.5 MG: 25 INJECTION INTRAMUSCULAR; INTRAVENOUS; SUBCUTANEOUS at 11:34

## 2021-07-16 RX ADMIN — FENTANYL CITRATE 50 MCG: 0.05 INJECTION, SOLUTION INTRAMUSCULAR; INTRAVENOUS at 11:44

## 2021-07-16 RX ADMIN — FAMOTIDINE 20 MG: 20 TABLET ORAL at 06:54

## 2021-07-16 RX ADMIN — NEOSTIGMINE 2.5 MG: 1 INJECTION INTRAVENOUS at 10:57

## 2021-07-16 RX ADMIN — PROMETHAZINE HYDROCHLORIDE 12.5 MG: 25 TABLET ORAL at 12:46

## 2021-07-16 RX ADMIN — LIDOCAINE 1 APPLICATION: 50 OINTMENT TOPICAL at 09:12

## 2021-07-16 RX ADMIN — SODIUM CHLORIDE, POTASSIUM CHLORIDE, SODIUM LACTATE AND CALCIUM CHLORIDE 9 ML/HR: 600; 310; 30; 20 INJECTION, SOLUTION INTRAVENOUS at 06:52

## 2021-07-16 RX ADMIN — ALBUTEROL SULFATE 5 PUFF: 90 AEROSOL, METERED RESPIRATORY (INHALATION) at 09:20

## 2021-07-16 RX ADMIN — ONDANSETRON 4 MG: 2 INJECTION INTRAMUSCULAR; INTRAVENOUS at 11:04

## 2021-07-16 RX ADMIN — SODIUM CHLORIDE, POTASSIUM CHLORIDE, SODIUM LACTATE AND CALCIUM CHLORIDE: 600; 310; 30; 20 INJECTION, SOLUTION INTRAVENOUS at 11:11

## 2021-07-16 RX ADMIN — HYDROCODONE BITARTRATE AND ACETAMINOPHEN 1 TABLET: 5; 325 TABLET ORAL at 11:58

## 2021-07-16 RX ADMIN — MIDAZOLAM HYDROCHLORIDE 2 MG: 1 INJECTION, SOLUTION INTRAMUSCULAR; INTRAVENOUS at 09:02

## 2021-07-16 RX ADMIN — PROPOFOL 200 MG: 10 INJECTION, EMULSION INTRAVENOUS at 09:08

## 2021-07-16 RX ADMIN — ACETAMINOPHEN 1000 MG: 500 TABLET, FILM COATED ORAL at 06:54

## 2021-07-16 RX ADMIN — HYDROMORPHONE HYDROCHLORIDE 0.5 MG: 1 INJECTION, SOLUTION INTRAMUSCULAR; INTRAVENOUS; SUBCUTANEOUS at 11:34

## 2021-07-16 RX ADMIN — LIDOCAINE HYDROCHLORIDE 50 MG: 10 INJECTION, SOLUTION INFILTRATION; PERINEURAL at 09:08

## 2021-07-16 RX ADMIN — ROCURONIUM BROMIDE 20 MG: 10 INJECTION, SOLUTION INTRAVENOUS at 09:57

## 2021-07-16 RX ADMIN — LIDOCAINE HYDROCHLORIDE 0.2 ML: 10 INJECTION, SOLUTION EPIDURAL; INFILTRATION; INTRACAUDAL; PERINEURAL at 06:52

## 2021-07-16 RX ADMIN — PROPOFOL 25 MCG/KG/MIN: 10 INJECTION, EMULSION INTRAVENOUS at 09:30

## 2021-07-16 RX ADMIN — SODIUM CHLORIDE, POTASSIUM CHLORIDE, SODIUM LACTATE AND CALCIUM CHLORIDE: 600; 310; 30; 20 INJECTION, SOLUTION INTRAVENOUS at 11:15

## 2021-07-16 RX ADMIN — HEPARIN SODIUM 5000 UNITS: 5000 INJECTION, SOLUTION INTRAVENOUS; SUBCUTANEOUS at 06:54

## 2021-07-16 RX ADMIN — FENTANYL CITRATE 50 MCG: 0.05 INJECTION, SOLUTION INTRAMUSCULAR; INTRAVENOUS at 11:59

## 2021-07-16 RX ADMIN — GLYCOPYRROLATE 0.4 MG: 0.4 INJECTION INTRAMUSCULAR; INTRAVENOUS at 10:57

## 2021-07-16 RX ADMIN — FENTANYL CITRATE 50 MCG: 50 INJECTION, SOLUTION INTRAMUSCULAR; INTRAVENOUS at 09:08

## 2021-07-16 RX ADMIN — ROCURONIUM BROMIDE 50 MG: 10 INJECTION, SOLUTION INTRAVENOUS at 09:10

## 2021-07-16 RX ADMIN — Medication 3 G: at 09:01

## 2021-07-16 RX ADMIN — SUGAMMADEX 2 MG: 100 INJECTION, SOLUTION INTRAVENOUS at 11:03

## 2021-07-16 RX ADMIN — IBUPROFEN 600 MG: 600 TABLET ORAL at 12:46

## 2021-07-16 NOTE — DISCHARGE INSTRUCTIONS
Discharge Instructions for Abdominal Surgery  Abdominal surgery is done through an incision in your belly. It may take a few weeks or longer to heal from the surgery. This sheet gives instructions on how to care for yourself once you’re home.    Medicines  Here is what to expect:  · You may be prescribed pain medicine. Do not wait until your pain becomes severe before taking the medicine. It may not work as well if you wait too long to take it between doses.  · Most surgeons prescribe stool softeners along with opioid prescriptions. Take these as prescribed.   · You may be prescribed antibiotics to help treat or prevent infection. Be sure to take all of the antibiotics even if you start to feel better.    Diet  Dietary tips include:  · Follow any diet instructions given by your healthcare provider. You may need to start with liquids and then slowly add solid foods back into your diet.   · If you have constipation, your healthcare provider may tell you to add more fiber to your diet. You may also be told to use a laxative or stool softener such as Miralax or Milk of Magnesia. These can often be bought over the counter.  · Drink plenty of fluids.    Activity  Recommendations include the following:  · Rest as often as needed.  · Ask your healthcare provider when you can shower or bathe. Have someone nearby in case you need help.  · Ask your family and friends to help with chores and errands.  · Don’t mow the lawn, vacuum, or do any strenuous activities for 4 to 6 weeks.  · Avoid lifting anything over 10 pounds for 4 to 6 weeks.  · Avoid driving until your healthcare provider says it’s OK.  · Walk as often as you feel able.  · Do the coughing and breathing exercises you were taught in the hospital. If you were given an incentive spirometer to help with breathing, use it as directed. This is important and will help prevent lung infections.   · Ask your healthcare provider when you can return to work.    Incision and  drain care  Do's and don'ts include the following:  · Keep your incision clean and dry. It’s OK to wash the skin around your incision with mild soap and water. It is OK to shower.  · If you have a dressing over your incision, change it as you were told. Replace the dressing if it becomes wet or dirty. In most cases, the dressing can be removed after 48 hours.  · If you have a drain, record the amount of drainage daily. You may also need to empty the drain and clean the attached tubing daily. Check with your healthcare provider if you can get your drain wet or if it needs to stay dry at all times.  · Don’t sit in a bathtub, pool, or hot tub until your incision is closed and any drains are removed.  · When coughing or sneezing, hold a pillow firmly against your incision with both hands. This is called “splinting.” Doing this helps protect your incision and decreases belly discomfort.  · Avoid picking, scratching, or pulling at your incision.  · Don’t use oils, or creams on your incision. Ask your healthcare provider before using lotions on your incision.    Follow-up  You will have one or more follow-up visits with your healthcare provider. These are needed to check how well you’re healing. Your drain, stitches, or staples may also be removed during these visits.    When to call the healthcare provider  Call your healthcare provider right away if you have any of the following:  · Fever of 100.4°F (38°C) or higher, or as advised by your healthcare provider  · Chest pain or trouble breathing  · Pain or tenderness in the leg  · Increased pain, redness, swelling, bleeding, or foul-smelling drainage at the incision site  · Incision separates or comes apart  · Pain or hardness in your belly that gets worse or isn’t relieved by pain medicine  · Nausea and vomiting that won’t go away  · Diarrhea that lasts more than 3 days  · Constipation or inability to pass gas for more than 3 days  · Dark-colored or bloody urine  · Bright  red or dark black stools  · Itchy, swollen skin; skin rash

## 2021-07-16 NOTE — H&P
Pre-Op H&P  Julia Jaime  [id number]  1970      Chief complaint: endometrial hyperplasia       Subjective:  Patient is a 50 y.o.female presents for scheduled surgery by Dr. Joyce. She anticipates a TOTAL LAPAROSCOPIC HYSTERECTOMY BILATERAL SALPINGOOPHORECTOMY WITH DAVINCI ROBOT, SENTINEL LYMPH NODE DISSECTION, POSSIBLE COMPLETION LYMPHADENECTOMY today. The patient had post-menopausal bleeding since August last year and underwent hysteroscopy, D&C and polypectomy with Myosure.  Bleeding has improved 2 weeks after surgery but pathology returned as atypical papillary and mucinous metaplasia within a background of simple hyperplasia.   3/3/2021: Endometrial biopsy with atypical papillary and mucinous metaplasia within a background of simple hyperplasia  4/8/2021: Endometrial curettings with atypical complex hyperplasia    Review of Systems:  Constitutional-- No fever, chills or sweats. No fatigue.  CV-- No chest pain, palpitation or syncope  Resp-- No SOB, cough, hemoptysis  Skin--No rashes or lesions      Allergies:   Allergies   Allergen Reactions   • Reglan [Metoclopramide] Mental Status Change   • Sudafed [Pseudoephedrine] Other (See Comments)     Heavily Sleep         Home Meds:  Medications Prior to Admission   Medication Sig Dispense Refill Last Dose   • fexofenadine (ALLEGRA) 60 MG tablet Take 60 mg by mouth Daily.   7/13/2021   • medroxyPROGESTERone (PROVERA) 10 MG tablet Take 1 tablet by mouth Daily. 30 tablet 1 7/13/2021   • omeprazole (priLOSEC) 20 MG capsule Take 20 mg by mouth Daily As Needed.   7/13/2021         PMH:   Past Medical History:   Diagnosis Date   • Asthma    • Depression     Feb 1st & May 26th-[facility] for mother   • GERD (gastroesophageal reflux disease)    • Hiccups    • Lumbar back pain    • PNA (pneumonia)    • Wears glasses      PSH:    Past Surgical History:   Procedure Laterality Date   • BREAST BIOPSY Right 1993    Benign   • DILATION AND CURETTAGE, DIAGNOSTIC /  THERAPEUTIC  02/01/2019    Cyst Removal   • ENDOSCOPY  2008   • HYSTEROSCOPY  04/08/2021    with D & C   • TONSILLECTOMY  1976       Immunization History:  Influenza: 2020  Pneumococcal: No  Tetanus: UTD  Covid : No    Social History:   Tobacco:   Social History     Tobacco Use   Smoking Status Never Smoker   Smokeless Tobacco Never Used      Alcohol:     Social History     Substance and Sexual Activity   Alcohol Use Never         Physical Exam:/55 (BP Location: Right arm, Patient Position: Lying)   Pulse 81   Temp 97.1 °F (36.2 °C) (Temporal)   Resp 18   SpO2 99%       General Appearance:    Alert, cooperative, no distress, appears stated age   Head:    Normocephalic, without obvious abnormality, atraumatic   Lungs:     Clear to auscultation bilaterally, respirations unlabored    Heart:   Regular rate and rhythm, S1 and S2 normal    Abdomen:    Soft without tenderness. Obese    Extremities:   Extremities normal, atraumatic, no cyanosis or edema   Skin:   Skin color, texture, turgor normal, no rashes or lesions   Neurologic:   Grossly intact     Results Review:     LABS:  Lab Results   Component Value Date    WBC 6.30 07/14/2021    HGB 13.3 07/14/2021    HCT 42.3 07/14/2021    MCV 97.2 (H) 07/14/2021     07/14/2021    NEUTROABS 2.64 07/14/2021    GLUCOSE 116 (H) 07/14/2021    BUN 12 07/14/2021    CREATININE 0.73 07/14/2021    EGFRIFNONA 84 07/14/2021     07/14/2021    K 5.1 07/14/2021     07/14/2021    CO2 28.0 07/14/2021    CALCIUM 9.2 07/14/2021    ALBUMIN 4.20 07/14/2021    AST 14 07/14/2021    ALT 24 07/14/2021    BILITOT 0.4 07/14/2021       RADIOLOGY:  Imaging Results (Last 72 Hours)     ** No results found for the last 72 hours. **          I reviewed the patient's new clinical results.    Cancer Staging (if applicable)  Cancer Patient: __ yes __no __unknown; If yes, clinical stage T:__ N:__M:__, stage group or __N/A      Impression: Endometrial hyperplasia      Plan: TOTAL  LAPAROSCOPIC HYSTERECTOMY BILATERAL SALPINGOOPHORECTOMY WITH DAVINCI ROBOT, SENTINEL LYMPH NODE DISSECTION, POSSIBLE COMPLETION LYMPHADENECTOMY      Bree Padilla, APRN   7/16/2021   07:03 EDT

## 2021-07-16 NOTE — ANESTHESIA PROCEDURE NOTES
Airway  Urgency: elective    Date/Time: 7/16/2021 9:12 AM  Airway not difficult    General Information and Staff    Patient location during procedure: OR  CRNA: Alessandra Wright CRNA    Indications and Patient Condition  Indications for airway management: airway protection    Preoxygenated: yes  MILS not maintained throughout  Mask difficulty assessment: 1 - vent by mask    Final Airway Details  Final airway type: endotracheal airway      Successful airway: ETT  Cuffed: yes   Successful intubation technique: direct laryngoscopy  Facilitating devices/methods: Bougie  Endotracheal tube insertion site: oral  Blade: Tato  Blade size: 3  ETT size (mm): 7.5  Cormack-Lehane Classification: grade I - full view of glottis  Placement verified by: chest auscultation and capnometry   Measured from: lips  ETT/EBT  to lips (cm): 20  Number of attempts at approach: 1  Assessment: lips, teeth, and gum same as pre-op and atraumatic intubation    Additional Comments  Hinson use bc patient has a freshly chipped tooth. Grade 1 View but unable to pass ETT without eschman, Negative epigastric sounds, Breath sound equal bilaterally with symmetric chest rise and fall

## 2021-07-16 NOTE — ANESTHESIA PREPROCEDURE EVALUATION
Anesthesia Evaluation     Patient summary reviewed and Nursing notes reviewed   history of anesthetic complications: prolonged sedation               Airway   Mallampati: II  TM distance: >3 FB  Neck ROM: full  No difficulty expected  Dental - normal exam     Comment: Couple cracked or broken teeth on right    Pulmonary - normal exam   (+) pneumonia (2-3 years ago) resolved , asthma,  Cardiovascular - negative cardio ROS and normal exam        Neuro/Psych  (+) psychiatric history Depression,     GI/Hepatic/Renal/Endo    (+) morbid obesity, GERD,      Musculoskeletal     (+) back pain,   Abdominal  - normal exam    Bowel sounds: normal.   Substance History - negative use     OB/GYN negative ob/gyn ROS         Other                        Anesthesia Plan    ASA 3     general     intravenous induction     Anesthetic plan, all risks, benefits, and alternatives have been provided, discussed and informed consent has been obtained with: patient.    Plan discussed with CRNA.

## 2021-07-16 NOTE — OP NOTE
Operative Note     Patient Name: Julia Jaime  : 1970   MRN: [id number]   Date: 21  Time: 10:57 EDT    Date of Service:  21  Time of Service:  10:57 EDT    Surgical Staff: Surgeon(s) and Role:     * Valeria Joyce MD - Primary   Additional Staff:   Assistant: Manny Perez PA-C  was responsible for performing the following activities: Retraction, Suction, Irrigation, Suturing, Closing, Placing Dressing and Held/Positioned Camera and their skilled assistance was necessary for the success of this case.     Pre-operative diagnosis(es): Pre-Op Diagnosis Codes:     * Endometrial hyperplasia [N85.00]  BMI > 50 kg/m2     Post-operative diagnosis(es): Post-Op Diagnosis Codes:     * Endometrial hyperplasia [N85.00]  BMI > 50 kg/m2   Procedure(s): Procedure(s):  TOTAL LAPAROSCOPIC HYSTERECTOMY BILATERAL SALPINGOOPHORECTOMY WITH DAVINCI ROBOT, INJECTION FOR SENTINEL LYMPH NODE MAPPING, BILATERAL PELVIC SENTINEL LYMPH NODE DISSECTION     Antibiotics: cefazolin (Ancef) ordered on call to OR     Anesthesia: Type: General  ASA:  III     Objective      Operative findings: Normal appearing cervix. Uterus sounded to 10 cm. Normal-appearing bilateral adnexa. No evidence of extrauterine disease. Bilateral sentinel lymph node mapping to the external iliac spaces. Upper abdominal survey normal.   Specimens removed: ID Type Source Tests Collected by Time   A (Not marked as sent) :  Tissue Uterus with Cervix, Bilateral Tubes and Ovaries TISSUE PATHOLOGY EXAM Valeria Joyce MD 2021 1024   B (Not marked as sent) : RIGHT PELVIC SENTINEL LYMPH NODE Tissue [place] Lymph Node TISSUE PATHOLOGY EXAM Valeria Joyce MD 2021 1037   C (Not marked as sent) : LEFT PELVIC SENTINEL LYMPH NODE Tissue [place] Lymph Node TISSUE PATHOLOGY EXAM Valeria Joyce MD 2021 1044      Fluid Intake and Output: I/O this shift:  In: 200 [I.V.:200]  Out: -    Blood products used: No   Drains: Urethral Catheter  Silicone 16 Fr. (Active)      Implant Information: Nothing was implanted during the procedure   Complications: None   Intraoperative consult(s):    Condition: stable   Disposition: to PACU and then admit to  floor versus discharge home     INDICATIONS FOR PROCEDURE:  Julia Jaime is a 50 y.o. female with a history of abnormal uterine bleeding and uterine sampling demonstrating complex atypical hyperplasia. She was counseled regarding options and she desired to proceed with hysterectomy, BSO and staging by a robotic approach. Questions were answered and consent was signed.      DESCRIPTION OF PROCEDURE:  The patient was taken to the operating room after the sites had been marked. After a time out procedure was performed, and the planned procedure and patient were confirmed, she was placed under general anesthesia with endotracheal intubation  She received prophylactic antibiotics prior to skin incision.  She was placed in the dorsal lithotomy position in the [facility] and prepared and draped in normal sterile fashion. A rubio catheter was placed sterilely.     A supraumbilical incision was then made with a scalpel.The peritoneal cavity was entered with the Veress needle. Correct placement of the Veress needle into the peritoneal cavity was confirmed by a drop in pressure. The abdomen was insufflated to a pressure of 15 mmHg. An 8-mm endoscope trocar was then placed through the incision with the findings as noted. Three 8-mm trocars were placed in the left and right upper quadrants. A 8-mm endoscopic trocar was placed in the right upper quadrant.     Prior to beginning the hysterectomy, a speculum was placed into the vagina and ICG dye injected into the cervix. A Maira manipulator (8 cm tip and 3 cm cup) with pneumo-occluder was placed in the vagina.     Given the patient's habitus I elected to proceed with hysterectomy first in event that the patient did not tolerate Trendelenburg. The patient was placed  in 22 degrees of Trendelenberg and the robot docked.     The round ligament was grasped, and the peritoneum over the sidewall was opened sharply.  The retroperitoneal space was then further developed, and the ureter was identified.  The peritoneum between the ureter and the infundibulopelvic ligament was then incised and a window was created.  The ovarian vessels were then thoroughly coagulated and transected.  The round ligament was then transected, and the vesicouterine peritoneum was further skeletonized to the midline. Similar steps were performed on the left side where also the retroperitoneum was opened, the ureter was visualized, and a window was then created between the ovarian vessels and the ureter prior to coagulating and transecting the ovarian vessels. The vesicouterine peritoneum was then further skeletonized, and the vaginal manipulator was further inserted to identify the vagina and to allow dissection of the bladder off of the vagina. Next the uterine vessels were carefully skeletonized before being coagulated and transected.  Subsequent pedicles were created closely along the cervix of the cardinal ligament and uterosacral ligaments. A colpotomy was then made until the uterus was fully removed from the vagina and the uterus handed off for permanent pathology. The vaginal cuff was closed with figure of eight 0-Vicryls.     We then put the patient in 28 degrees of Trendelenberg. The pararectal and paravesical spaces were developed.  On the right, she had a sentinel lymph node in the external iliac space.  The obturator nerve and ureter were identified and kept out of harm's way.  The lymph node was then carefully dissected free and sent for pathologic evaluation (permanent).  On the left, a sentinel node was identified in the external iliac space. The ureter and obturator nerve was identified and kept out of harm's way.  The sentinel lymph node was then dissected free from surrounding tissue and sent  for pathology.  Hemostasis was confirmed. Of note, all nodes were removed in bags through the 8-mm right sided port.    There was some bleeding noted from the bilateral uterine pedicles. The pedicles were further isolated ensuring that the ureter was out of the surgical field. Bipolar cautery was used to ensure hemostasis.    We again inspected all areas, and they all were found to be hemostatic, and no instruments, needles or sponges were left in the abdomen. The bladder was backfilled with 120 cc of sterile saline and the rubio catheter removed. The robotic instruments were removed and the robot undocked. Skin incisions were closed with 4-0 monocryl in a subcuticular fashion, and skin adhesive was placed over these incisions. Sponge, lap and needle counts were correct x2.  The patient was then woken up from anesthesia and taken to PACU in stable condition.

## 2021-07-24 LAB
CYTO UR: NORMAL
DX PRELIMINARY: NORMAL
LAB AP CASE REPORT: NORMAL
LAB AP CLINICAL INFORMATION: NORMAL
LAB AP DIAGNOSIS COMMENT: NORMAL
Lab: NORMAL
PATH REPORT.ADDENDUM SPEC: NORMAL
PATH REPORT.FINAL DX SPEC: NORMAL
PATH REPORT.GROSS SPEC: NORMAL

## 2021-07-26 ENCOUNTER — TELEPHONE (OUTPATIENT)
Dept: GYNECOLOGIC ONCOLOGY | Facility: CLINIC | Age: 51
End: 2021-07-26

## 2021-07-26 NOTE — TELEPHONE ENCOUNTER
Called patient to discuss pathology results. She reports that she is doing well since her surgery. She denies any issues. 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 Given these findings no further therapy is recommended. Patient verbalizes understanding and we will see her at her postoperative appointment.    Electronically signed by Valeria Joyce MD, 07/26/21, 4:03 PM EDT.

## 2021-07-30 NOTE — ANESTHESIA POSTPROCEDURE EVALUATION
Patient: Julia Jaime    Procedure Summary     Date: 07/16/21 Room / Location:  [facility]  [facility]    Anesthesia Start: 0900 Anesthesia Stop: 1115    Procedure: TOTAL LAPAROSCOPIC HYSTERECTOMY BILATERAL SALPINGOOPHORECTOMY WITH DAVINCI ROBOT, INJECTION FOR SENTINEL LYMPH NODE MAPPING, BILATERAL PELVIC SENTINEL LYMPH NODE DISSECTION (N/A Abdomen) Diagnosis:       Endometrial hyperplasia      (Endometrial hyperplasia [N85.00])    Surgeons: Valeria Joyce MD Provider: Edward Nails MD    Anesthesia Type: general ASA Status: 3          Anesthesia Type: general    Vitals  Vitals Value Taken Time   /89 07/16/21 1245   Temp 98.6 °F (37 °C) 07/16/21 1200   Pulse 74 07/16/21 1245   Resp 20 07/16/21 1245   SpO2 99 % 07/16/21 1245           Post Anesthesia Care and Evaluation    Patient location during evaluation: PACU  Patient participation: complete - patient participated  Level of consciousness: awake and alert  Pain management: adequate  Airway patency: patent  Anesthetic complications: No anesthetic complications  PONV Status: none  Cardiovascular status: hemodynamically stable and acceptable  Respiratory status: nonlabored ventilation, acceptable and nasal cannula  Hydration status: acceptable

## 2021-08-03 PROBLEM — C54.1 ENDOMETRIAL CANCER: Status: ACTIVE | Noted: 2021-05-13

## 2021-08-03 NOTE — PROGRESS NOTES
"     Post Operative Office Visit      Patient Name: Julia Jaime  : 1970   MRN: [id number]     Chief Complaint:  Postoperative visit    History of Present Illness: Julia Jaime is a 50 y.o. female who is status post robotic-assisted total laparoscopic hysterectomy, bilateral salpingo-oophorectomy and bilateral sentinel lymph node dissection on 2021 for complex atypical hyperplasia. Her post operative course has been unremarkable and continues to be recovering well. She is tolerating a normal diet. She reports normal return of bowel function. She states her pain is well controlled.     Medical, surgical, and family history updated as needed.  Subjective      Review of Systems:   Review of Systems   Constitutional: Negative for chills, fatigue and fever.   Respiratory: Negative for cough, shortness of breath and wheezing.    Cardiovascular: Negative for chest pain, palpitations and leg swelling.   Gastrointestinal: Negative for abdominal pain, constipation, diarrhea, nausea and vomiting.   Genitourinary: Negative for dysuria, pelvic pain, urgency, vaginal bleeding, vaginal discharge and vaginal pain.   Neurological: Negative for dizziness and light-headedness.        Objective   Physical Exam:  Vital Signs:   Vitals:    21 0955   BP: 124/74   Pulse: 92   Resp: 18   Temp: 98 °F (36.7 °C)   TempSrc: Temporal   Weight: 136 kg (300 lb)   Height: 162.6 cm (64.02\")   PainSc: 0-No pain     BMI: Body mass index is 51.47 kg/m².     Physical Exam  Vitals and nursing note reviewed. Exam conducted with a chaperone present.   Constitutional:       General: She is not in acute distress.     Appearance: Normal appearance. She is well-developed. She is not diaphoretic.   HENT:      Head: Normocephalic and atraumatic.      Right Ear: External ear normal.      Left Ear: External ear normal.      Nose: Nose normal.   Eyes:      General: No scleral icterus.        Right eye: No discharge.         Left " eye: No discharge.      Conjunctiva/sclera: Conjunctivae normal.   Neck:      Thyroid: No thyromegaly.   Cardiovascular:      Rate and Rhythm: Normal rate.   Pulmonary:      Effort: Pulmonary effort is normal. No respiratory distress.   Abdominal:      General: There is no distension.      Palpations: Abdomen is soft. There is no mass.      Tenderness: There is no abdominal tenderness. There is no guarding.      Comments: Laparoscopic incisions C/D/I   Genitourinary:     Comments: External genitalia normal. Vagina without discharge. Vaginal cuff intact. Uterus, cervix and adnexa surgically absent. Rectovaginal exam deferred.  Musculoskeletal:         General: No swelling, tenderness, deformity or signs of injury.      Cervical back: Neck supple.      Right lower leg: No edema.      Left lower leg: No edema.   Lymphadenopathy:      Cervical: No cervical adenopathy.   Skin:     General: Skin is warm and dry.      Coloration: Skin is not jaundiced.      Findings: No erythema, lesion or rash.   Neurological:      General: No focal deficit present.      Mental Status: She is alert and oriented to person, place, and time. Mental status is at baseline.      Motor: No abnormal muscle tone.   Psychiatric:         Behavior: Behavior normal.         Thought Content: Thought content normal.         Medications:     Current Outpatient Medications:   •  docusate sodium (COLACE) 100 MG capsule, Take 1 capsule by mouth 2 (Two) Times a Day., Disp: 60 capsule, Rfl: 0  •  fexofenadine (ALLEGRA) 60 MG tablet, Take 60 mg by mouth Daily., Disp: , Rfl:   •  omeprazole (priLOSEC) 20 MG capsule, Take 20 mg by mouth Daily As Needed., Disp: , Rfl:   Current outpatient and discharge medications have been reconciled for the patient.  Reviewed by: Valeria Joyce MD      Allergies:   Allergies   Allergen Reactions   • Reglan [Metoclopramide] Mental Status Change   • Sudafed [Pseudoephedrine] Other (See Comments)     Heavily Sleep        Pathology:   UTERUS ENDOMETRIUM TEMPLATE:  SPECIMEN TYPE/ PROCEDURE: Hysterectomy bilateral salpingo-oophorectomy  LYMPH NODE SAMPLING: [place] node sampling  SPECIMEN INTEGRITY:   Intact  TUMOR SIZE (greatest dimension): Indeterminate (no grossly evident lesion)  HISTOLOGIC TYPE: Endometrioid adenocarcinoma  HISTOLOGIC GRADE: FIGO grade 1  MYOMETRIAL INVASION (Present/Not identified): Not identified  INVOLVEMENT OF CERVICAL STROMA: Not identified  EXTENT OF INVOLVEMENT OF OTHER TISSUE/ORGANS: No other tissue/organ involvement identified  UTERINE SEROSA INVOLVEMENT: Not identified  MARGINS: Negative  PELVIC LYMPH NODES (SUBMITTED/NONE): Submitted  NUMBER OF PELVIC LYMPH NODES EXAMINED: 2  NUMBER OF PELVIC SENTINEL NODES EXAMINED: 2  LATERALITY OF PELVIC LYMPH NODES EXAMINED: Bilateral  NUMBER OF PELVIC LYMPH NODES WITH MACROMETASTASIS: 0  NUMBER OF PELVIC LYMPH NODES WITH MICROMETASTASIS: 0  NUMBER OF PELVIC LYMPH NODES WITH ITC’S: 0  LATERALITY OF PELVIC NODES WITH TUMOR: N/A  PARA-AORTIC LYMPH NODES (SUBMITTED/NONE): None submitted  LYMPHVASCULAR INVASION: Not identified  MSI TESTING:   Results to be issued as an Addendum  ADDITIONAL PATHOLOGIC FINDINGS: Cellular and highly cellular leiomyomata; adenomyosis  ANCILLARY STUDIES: Immunohistochemical staining; external consultation  AJCC PATHOLOGIC STAGE:  (COMPLETED BY PATHOLOGIST, BASED ONLY ON TISSUE FINDINGS, MORE EXTENSIVE DISEASE MAY NOT BE KNOWN TO THE PATHOLOGIST)  pT=  1a  pN=    0(sn)  AJCC PATHOLOGIC STAGE:  IA .     Operative findings again reviewed. Pathology report discussed.       Assessment / Plan    Julia Jaime is a 50 y.o. who underwent a robotic-assisted total laparoscopic hysterectomy with bilateral salpingo-oophorectomy and bilateral sentinel lymph node dissection. She is recovering well from surgery. We discussed continuing postoperative restrictions. Her pathology report was reviewed and demonstrates a Stage IA (0% myometrial  invasion), grade 1 endometrial cancer with no LVSI. She therefore has a low risk of recurrence. Therefore, surgery is considered definitive and she will require no further therapy. She has been advised that she will require continued surveillance with pelvic examinations, but will no longer require cytology (Pap tests). She was also counseled on possible symptoms of recurrence including vaginal bleeding, pelvic pain, changes in bowel and bladder symptoms, or shortness of breath.  We will plan for visits every 6 months for the first 2 years.  We can alternate this with Dr. Nelson due to patient's distance from the hospital.    I spent 10 minutes with the patient in face-to-face discussion and counseling regarding the significance of the pathology report, future treatment options, and surveillance for her newly diagnosed cancer. This is above and beyond her routine post-operative care and separate from the procedure performed.      Assessment/Plan:   Diagnoses and all orders for this visit:    1. Postop check (Primary)    2. Endometrial cancer (CMS/HCC)         Follow Up:   In 6 months with Dr. Nelson  In 1 year with hunter Joyce MD  Gynecologic Oncology     Please note that portions of this note may have been completed with a voice recognition program.

## 2021-08-05 ENCOUNTER — OFFICE VISIT (OUTPATIENT)
Dept: GYNECOLOGIC ONCOLOGY | Facility: CLINIC | Age: 51
End: 2021-08-05

## 2021-08-05 VITALS
BODY MASS INDEX: 50.02 KG/M2 | HEART RATE: 92 BPM | WEIGHT: 293 LBS | SYSTOLIC BLOOD PRESSURE: 124 MMHG | RESPIRATION RATE: 18 BRPM | DIASTOLIC BLOOD PRESSURE: 74 MMHG | HEIGHT: 64 IN | TEMPERATURE: 98 F

## 2021-08-05 DIAGNOSIS — Z09 POSTOP CHECK: Primary | ICD-10-CM

## 2021-08-05 DIAGNOSIS — C54.1 ENDOMETRIAL CANCER (HCC): ICD-10-CM

## 2021-08-05 PROCEDURE — 99024 POSTOP FOLLOW-UP VISIT: CPT | Performed by: OBSTETRICS & GYNECOLOGY

## 2021-10-15 ENCOUNTER — TELEPHONE (OUTPATIENT)
Dept: ONCOLOGY | Facility: CLINIC | Age: 51
End: 2021-10-15

## 2021-11-12 ENCOUNTER — TELEPHONE (OUTPATIENT)
Dept: GYNECOLOGIC ONCOLOGY | Facility: CLINIC | Age: 51
End: 2021-11-12

## 2021-11-12 NOTE — TELEPHONE ENCOUNTER
Provider: DR ENGEL    Caller: RAKESH BLAKE    Relationship to Patient: DISABILITY REP     Reason for Call: RAKESH BLAKE IS  NEEDING TO VERIFY DISABILITY DATES THAT IS WRITTEN ON THE DISABILITY FORM.    REF # [id number]    PLEASE ADVISE

## 2021-11-16 NOTE — TELEPHONE ENCOUNTER
After receiving a phone call from the hub regarding this issue, I called again and I left my number to be called back.   If they do call back, the disability dates are for 07/16/2021-08/27/2021.

## 2022-08-04 ENCOUNTER — OFFICE VISIT (OUTPATIENT)
Dept: GYNECOLOGIC ONCOLOGY | Facility: CLINIC | Age: 52
End: 2022-08-04

## 2022-08-04 VITALS
HEART RATE: 102 BPM | HEIGHT: 64 IN | RESPIRATION RATE: 24 BRPM | TEMPERATURE: 98.2 F | SYSTOLIC BLOOD PRESSURE: 118 MMHG | DIASTOLIC BLOOD PRESSURE: 93 MMHG | OXYGEN SATURATION: 99 % | WEIGHT: 293 LBS | BODY MASS INDEX: 50.02 KG/M2

## 2022-08-04 DIAGNOSIS — E66.01 MORBID OBESITY: ICD-10-CM

## 2022-08-04 DIAGNOSIS — Z85.42 HISTORY OF UTERINE CANCER: Primary | ICD-10-CM

## 2022-08-04 PROCEDURE — 99213 OFFICE O/P EST LOW 20 MIN: CPT | Performed by: OBSTETRICS & GYNECOLOGY

## 2022-08-04 NOTE — PROGRESS NOTES
Follow Up Office Visit      Patient Name: Julia Jaime  : 1970   MRN: [id number]     Chief Complaint:  Surveillance of endometrial cancer    History of Present Illness: Julia Jaime is a 51 y.o. female who is here today to follow up with Gynecologic Oncology for a history of endometrial cancer. Today, she is overall doing well. She denies vaginal bleeding and discharge. She does not have abdominal or pelvic pain. She is tolerating a regular diet and endorses a normal appetite. She denies nausea and vomiting. She denies early satiety and bloating. She denies changes in her bowel/bladder.  She does report shortness of breath and is now on 3 L of oxygen after developing COVID-pneumonia requiring a weeklong hospital stay.  She states that her shortness of breath is worse on exertion and is occasionally associated with chest pain.  She is following up with pulmonology closer to home for this.    Oncologic History:  Oncology/Hematology History   Endometrial cancer (HCC)   2021 Initial Diagnosis    Referred by Dr. Renetta Nelson    3/3/2021: Endometrial biopsy with atypical papillary and mucinous metaplasia within a background of simple hyperplasia  2021: Endometrial curettings with atypical complex hyperplasia     2021 Surgery    Robotic-assisted total laparoscopic hysterectomy with bilateral salpingo-oophorectomy and bilateral sentinel lymph node dissection    Pathology with FIGO grade 1 endometrioid adenocarcinoma with 0% MI. Negative cervix, adnexa, and bilateral pelvic lymph nodes. MMR intact.    [facility]gery at [facility] by Valeria Joyce MD       2021 Cancer Staged    Staging form: Corpus Uteri - Carcinoma And Carcinosarcoma, AJCC 8th Edition  - Clinical stage from 2021: FIGO Stage IA (cT1a, cN0(sn), cM0) - Signed by Valerai Joyce MD on 8/3/2021          I have reviewed and the following portions of the patient's history were updated as appropriate: past family  "history, past medical history, past social history, past surgical history, past obstetrics/gynecologic history and problem list.    Medications: The current medication list was reviewed with the patient and updated in the EMR this date per the Medical Assistant. Medication dosages and frequencies were confirmed to be accurate.      Allergies:   Allergies   Allergen Reactions   • Reglan [Metoclopramide] Mental Status Change   • Sudafed [Pseudoephedrine] Other (See Comments)     Heavily Sleep       Subjective      Review of Systems:   Review of Systems   Constitutional: Positive for activity change and fatigue. Negative for appetite change, chills, fever and unexpected weight change.   Respiratory: Positive for chest tightness and shortness of breath. Negative for cough and wheezing.    Cardiovascular: Positive for chest pain. Negative for palpitations and leg swelling.   Gastrointestinal: Negative for abdominal distention, abdominal pain, constipation, diarrhea, nausea and vomiting.   Genitourinary: Negative for dyspareunia, dysuria, frequency, hematuria, pelvic pain, urgency and vaginal bleeding.   Musculoskeletal: Negative for arthralgias, back pain and myalgias.   Neurological: Negative for dizziness, light-headedness, numbness and headaches.   Psychiatric/Behavioral: Negative for dysphoric mood. The patient is not nervous/anxious.         Objective     Physical Exam:  Vital Signs:   Vitals:    08/04/22 1134   BP: 118/93  Comment: LUE   Pulse: 102   Resp: 24   Temp: 98.2 °F (36.8 °C)   TempSrc: Temporal   SpO2: 99%  Comment: 3L   Weight: (!) 144 kg (317 lb)   Height: 162.6 cm (64\")   PainSc: 0-No pain     BMI: Body mass index is 54.41 kg/m².   ECOG score: 0           PHQ-2 Depression Screening  Little interest or pleasure in doing things? 0-->not at all   Feeling down, depressed, or hopeless? 1-->several days   PHQ-2 Total Score 1         Physical Exam  Vitals and nursing note reviewed. Exam conducted with a " chaperone present.   Constitutional:       General: She is not in acute distress.     Appearance: Normal appearance. She is well-developed. She is not diaphoretic.   HENT:      Head: Normocephalic and atraumatic.      Right Ear: External ear normal.      Left Ear: External ear normal.      Nose:      Comments: NC in place  Eyes:      General: No scleral icterus.        Right eye: No discharge.         Left eye: No discharge.      Conjunctiva/sclera: Conjunctivae normal.   Neck:      Thyroid: No thyromegaly.   Cardiovascular:      Rate and Rhythm: Normal rate and regular rhythm.      Heart sounds: No murmur heard.  Pulmonary:      Effort: No respiratory distress.      Breath sounds: Normal breath sounds. No wheezing.      Comments: Mild use of accessory respiratory muscles.  Abdominal:      General: Bowel sounds are normal. There is no distension.      Palpations: Abdomen is soft. There is no mass.      Tenderness: There is no abdominal tenderness. There is no guarding.   Genitourinary:     Comments: External genitalia normal. Vagina without discharge. Cervix, uterus and adnexa surgically absent. Rectovaginal exam deferred.  Musculoskeletal:         General: No swelling, tenderness, deformity or signs of injury.      Cervical back: Neck supple.      Right lower leg: No edema.      Left lower leg: No edema.   Lymphadenopathy:      Cervical: No cervical adenopathy.   Skin:     General: Skin is warm and dry.      Coloration: Skin is not jaundiced.      Findings: No erythema, lesion or rash.   Neurological:      General: No focal deficit present.      Mental Status: She is alert and oriented to person, place, and time. Mental status is at baseline.      Motor: No abnormal muscle tone.   Psychiatric:         Behavior: Behavior normal.         Thought Content: Thought content normal.         Assessment / Plan    Julia Jaime is a 51 y.o. with a history of a stage IA, grade 1 endometrial cancer s/p surgical  management (treatment completed in 7/2021).  She is currently without clinical evidence of disease. Signs of recurrent disease, such as vaginal bleeding, persistent abdominopelvic pain, urinary or bowel changes, and shortness of breath were reviewed.  She was advised to follow up immediately if she develops any of the above symptoms. She will follow-up in 6 months with Dr. Nelson and in 1 year with me.    Health maintenance: She was reminded to maintain a healthy lifestyle with a well balanced diet, calcium and vitamin D for osteoporosis prevention, and exercise as well as continue with recommended health and cancer screening guidelines.     Morbid obesity (current Body mass index is 54.41 kg/m².): I counseled her that excess body weight is associated with the development of many health issues (including cancer and heart disease) and increases the risk of surgical complications. She was reminded the importance of a healthy body weight. Diet and exercise guidelines ([employer]) were reviewed. She was also offered nutritional services (counseling, information on weight loss programs, etc.) and we will continue to review this issue at each visit.    Pain assessment was performed today as a part of patient’s care.  For patients with pain related to surgery, gynecologic malignancy or cancer treatment, the plan is as noted in the assessment/plan.  For patients with pain not related to these issues, they are to seek any further needed care from a more appropriate provider, such as PCP.      Diagnoses and all orders for this visit:    1. History of uterine cancer (Primary)         Follow Up: In 1 year with me. In 6 months with Dr. Nelson.    I spent 22 minutes caring for Julia on this date of service. This time includes time spent by me in the following activities: preparing for the visit, performing a medically appropriate examination and/or evaluation, counseling and educating the  patient/family/caregiver, ordering medications, tests, or procedures, referring and communicating with other health care professionals, documenting information in the medical record and care coordination      AMARA Joyce MD  Gynecologic Oncology

## 2023-08-03 ENCOUNTER — OFFICE VISIT (OUTPATIENT)
Dept: GYNECOLOGIC ONCOLOGY | Facility: CLINIC | Age: 53
End: 2023-08-03

## 2023-08-03 VITALS
TEMPERATURE: 97.7 F | BODY MASS INDEX: 50.02 KG/M2 | HEART RATE: 64 BPM | OXYGEN SATURATION: 99 % | HEIGHT: 64 IN | RESPIRATION RATE: 22 BRPM | SYSTOLIC BLOOD PRESSURE: 140 MMHG | DIASTOLIC BLOOD PRESSURE: 79 MMHG | WEIGHT: 293 LBS

## 2023-08-03 DIAGNOSIS — Z85.42 HISTORY OF UTERINE CANCER: Primary | ICD-10-CM

## 2023-08-03 RX ORDER — DOXYCYCLINE HYCLATE 100 MG
1 TABLET ORAL 2 TIMES DAILY
Qty: 20 TABLET | Refills: 0 | COMMUNITY
Start: 2023-07-25 | End: 2023-08-04

## 2023-08-03 RX ORDER — AMOXICILLIN AND CLAVULANATE POTASSIUM 875; 125 MG/1; MG/1
1 TABLET, FILM COATED ORAL 2 TIMES DAILY
COMMUNITY
Start: 2023-04-13

## 2023-08-03 RX ORDER — ALBUTEROL SULFATE 2.5 MG/3ML
2.5 SOLUTION RESPIRATORY (INHALATION) EVERY 6 HOURS PRN
COMMUNITY
Start: 2023-06-15

## 2023-08-03 RX ORDER — ALBUTEROL SULFATE 90 UG/1
2 AEROSOL, METERED RESPIRATORY (INHALATION) EVERY 6 HOURS PRN
COMMUNITY
Start: 2023-06-15

## (undated) DEVICE — SYR LUERLOK 5CC

## (undated) DEVICE — SPK TRANSFR TRANSOFIX DBL STRL

## (undated) DEVICE — ANTIBACTERIAL UNDYED BRAIDED (POLYGLACTIN 910), SYNTHETIC ABSORBABLE SURGICAL SUTURE: Brand: COATED VICRYL

## (undated) DEVICE — TIP COVER ACCESSORY

## (undated) DEVICE — MANIP UTER RUMI TP 6.7MMX8CM BLU

## (undated) DEVICE — ARM DRAPE

## (undated) DEVICE — BLADELESS OBTURATOR: Brand: WECK VISTA

## (undated) DEVICE — ANCHOR TISSUE RETRIEVAL SYSTEM, BAG SIZE 125 ML, PORT SIZE 8 MM: Brand: ANCHOR TISSUE RETRIEVAL SYSTEM

## (undated) DEVICE — ANTIBACTERIAL UNDYED BRAIDED (POLYGLACTIN 910), SYNTHETIC ABSORBABLE SUTURE: Brand: COATED VICRYL

## (undated) DEVICE — UNDERGLV SURG BIOGEL INDICAT PF 61/2 GRN

## (undated) DEVICE — KT POSTN TRENDELENBURG NBXL PAD WING STRAP TABL HDRST XLG

## (undated) DEVICE — SHEET, DRAPE, SPLIT, STERILE: Brand: MEDLINE

## (undated) DEVICE — COLUMN DRAPE

## (undated) DEVICE — APPL CHLORAPREP TINTED 26ML TEAL

## (undated) DEVICE — PK MAJ GYN DAVINCI 10

## (undated) DEVICE — GLV SURG SENSICARE W/ALOE PF LF 6.5 STRL

## (undated) DEVICE — ENDOPATH PNEUMONEEDLE INSUFFLATION NEEDLES WITH LUER LOCK CONNECTORS 120MM: Brand: ENDOPATH

## (undated) DEVICE — SUT MNCRYL PLS ANTIB UD 3/0 PS2 27IN

## (undated) DEVICE — ADHS SKIN PREMIERPRO EXOFIN TOPICAL HI/VISC .5ML

## (undated) DEVICE — BLANKT WARM UPPR/BDY ARM/OUT 57X196CM

## (undated) DEVICE — PATIENT RETURN ELECTRODE, SINGLE-USE, CONTACT QUALITY MONITORING, ADULT, WITH 9FT CORD, FOR PATIENTS WEIGING OVER 33LBS. (15KG): Brand: MEGADYNE

## (undated) DEVICE — MANIP UTER RUMI 2 KOH EFFICIENT 3CM BL

## (undated) DEVICE — CANNULA SEAL

## (undated) DEVICE — ST TBG CONN PNEUMOCLEAR EVAC SMOKE HEAT/HUMID